# Patient Record
Sex: MALE
[De-identification: names, ages, dates, MRNs, and addresses within clinical notes are randomized per-mention and may not be internally consistent; named-entity substitution may affect disease eponyms.]

---

## 2023-06-29 ENCOUNTER — APPOINTMENT (OUTPATIENT)
Dept: UROLOGY | Facility: CLINIC | Age: 28
End: 2023-06-29
Payer: COMMERCIAL

## 2023-06-29 VITALS
SYSTOLIC BLOOD PRESSURE: 119 MMHG | TEMPERATURE: 98.2 F | BODY MASS INDEX: 27.49 KG/M2 | HEIGHT: 65 IN | RESPIRATION RATE: 14 BRPM | DIASTOLIC BLOOD PRESSURE: 83 MMHG | HEART RATE: 92 BPM | WEIGHT: 165 LBS | OXYGEN SATURATION: 96 %

## 2023-06-29 DIAGNOSIS — R39.9 UNSPECIFIED SYMPTOMS AND SIGNS INVOLVING THE GENITOURINARY SYSTEM: ICD-10-CM

## 2023-06-29 DIAGNOSIS — L29.1 PRURITUS SCROTI: ICD-10-CM

## 2023-06-29 PROBLEM — Z00.00 ENCOUNTER FOR PREVENTIVE HEALTH EXAMINATION: Status: ACTIVE | Noted: 2023-06-29

## 2023-06-29 PROCEDURE — 99204 OFFICE O/P NEW MOD 45 MIN: CPT

## 2023-06-29 RX ORDER — CLOTRIMAZOLE AND BETAMETHASONE DIPROPIONATE 10; .5 MG/G; MG/G
1-0.05 CREAM TOPICAL 3 TIMES DAILY
Qty: 2 | Refills: 5 | Status: ACTIVE | COMMUNITY
Start: 2023-06-29 | End: 1900-01-01

## 2023-06-29 NOTE — PHYSICAL EXAM
[General Appearance - Well Developed] : well developed [General Appearance - Well Nourished] : well nourished [Normal Appearance] : normal appearance [Well Groomed] : well groomed [General Appearance - In No Acute Distress] : no acute distress [Edema] : no peripheral edema [Respiration, Rhythm And Depth] : normal respiratory rhythm and effort [Exaggerated Use Of Accessory Muscles For Inspiration] : no accessory muscle use [Abdomen Soft] : soft [Abdomen Tenderness] : non-tender [Costovertebral Angle Tenderness] : no ~M costovertebral angle tenderness [Urethral Meatus] : meatus normal [Urinary Bladder Findings] : the bladder was normal on palpation [Scrotum] : the scrotum was normal [Testes Mass (___cm)] : there were no testicular masses [FreeTextEntry1] : erythematous and itchy plaque on right hemiscrotum [Normal Station and Gait] : the gait and station were normal for the patient's age [] : no rash [No Focal Deficits] : no focal deficits [Oriented To Time, Place, And Person] : oriented to person, place, and time [Affect] : the affect was normal [Mood] : the mood was normal [Not Anxious] : not anxious [No Palpable Adenopathy] : no palpable adenopathy

## 2023-06-29 NOTE — HISTORY OF PRESENT ILLNESS
[FreeTextEntry1] : Mr. KING is a 28 year  Declined  M who comes today to clinic for dysuria for about 4 weeks. Initially got treated for Chlamydia with Azithromycin 1 dose. \par No history of STDs in the past. \par Sexually active, not in a commited relationship. Denies rectal intercourse.\par Denies LUTS, fevers, chills, hematuria, flank pain. He has never smoked. \par No family history of Prostate cancer.\par \par

## 2023-06-29 NOTE — ASSESSMENT
[FreeTextEntry1] : Mr. KING is a 28 year  Declined  M who comes today to clinic for urethritis likely unresolved chlamydia, and scrotal tinea. \par \par We discussed the the different possible presentations of UTIs/STDs in males including but not limited to urethritis, prostatitis, orchitis, genital ulcers, genital warts, inguinal lymphadenopathy, balanitis, papules, blisters.\par \par Urethritis/Prostatitis\par Infectious urethritis/prostatitis is typically caused by a sexually transmitted pathogen; thus, most cases are seen in young, sexually active men. Neisseria gonorrhoeae and Chlamydia trachomatis are commonly identified in cases of urethritis. Mycoplasma genitalium has also been strongly associated with urethritis. Dysuria, or discomfort with urination, is usually the chief complaint in males with urethritis and is reported in the majority of males with gonorrhea and over half of patients with nongonococcal urethritis. Other complaints include pruritus, burning, and discharge at the urethral meatus.\par \par

## 2023-06-30 ENCOUNTER — APPOINTMENT (OUTPATIENT)
Dept: UROLOGY | Facility: CLINIC | Age: 28
End: 2023-06-30
Payer: COMMERCIAL

## 2023-06-30 LAB
APPEARANCE: CLEAR
BACTERIA: NEGATIVE /HPF
BILIRUBIN URINE: NEGATIVE
BLOOD URINE: NEGATIVE
C TRACH RRNA SPEC QL NAA+PROBE: NOT DETECTED
CAST: 0 /LPF
COLOR: YELLOW
EPITHELIAL CELLS: 0 /HPF
GLUCOSE QUALITATIVE U: NEGATIVE MG/DL
KETONES URINE: NEGATIVE MG/DL
LEUKOCYTE ESTERASE URINE: NEGATIVE
MICROSCOPIC-UA: NORMAL
N GONORRHOEA RRNA SPEC QL NAA+PROBE: NOT DETECTED
NITRITE URINE: NEGATIVE
PH URINE: 7.5
PROTEIN URINE: NEGATIVE MG/DL
RED BLOOD CELLS URINE: 0 /HPF
SOURCE AMPLIFICATION: NORMAL
SPECIFIC GRAVITY URINE: 1.01
UROBILINOGEN URINE: 0.2 MG/DL
WHITE BLOOD CELLS URINE: 0 /HPF

## 2023-06-30 PROCEDURE — 99213 OFFICE O/P EST LOW 20 MIN: CPT

## 2023-06-30 RX ORDER — CEFTRIAXONE 1 G/1
1 INJECTION, POWDER, FOR SOLUTION INTRAMUSCULAR; INTRAVENOUS
Qty: 1 | Refills: 0 | Status: COMPLETED | OUTPATIENT
Start: 2023-06-30

## 2023-06-30 RX ADMIN — CEFTRIAXONE SODIUM 0 GM: 1 INJECTION, POWDER, FOR SOLUTION INTRAMUSCULAR; INTRAVENOUS at 00:00

## 2023-07-03 LAB — BACTERIA UR CULT: NORMAL

## 2023-07-05 LAB — URINE CYTOLOGY: NORMAL

## 2023-07-05 RX ORDER — CEFTRIAXONE 1 G/1
1 INJECTION, POWDER, FOR SOLUTION INTRAMUSCULAR; INTRAVENOUS
Qty: 1 | Refills: 0 | Status: COMPLETED | COMMUNITY
Start: 2023-06-30 | End: 2023-06-30

## 2023-07-05 NOTE — HISTORY OF PRESENT ILLNESS
[FreeTextEntry1] : Current Meds\par Clotrimazole-Betamethasone 1-0.05 % External Cream; APPLY SPARINGLY TO\par AFFECTED AREA(S) 3 TIMES A DAY\par Doxycycline Hyclate 100 MG Oral Tablet; TAKE 1 TABLET EVERY 12 HOURS DAILY\par \par Allergies\par No Known Drug Allergies\par \par Procedure\par Patient was given an injection of Ceftriaxone. Injection was given in the left deltoid. Patient tolerated injection well. \par \par \par Lot# [id number]\par Exp: 01/2025

## 2023-07-06 LAB
MYCOPLASMA HOMINIS CULTURE: POSITIVE
UREAPLASMA CULTURE: NEGATIVE

## 2023-07-17 ENCOUNTER — NON-APPOINTMENT (OUTPATIENT)
Age: 28
End: 2023-07-17

## 2023-07-17 ENCOUNTER — RX RENEWAL (OUTPATIENT)
Age: 28
End: 2023-07-17

## 2023-07-17 RX ORDER — DOXYCYCLINE HYCLATE 100 MG/1
100 TABLET ORAL
Qty: 42 | Refills: 0 | Status: ACTIVE | COMMUNITY
Start: 2023-06-29 | End: 1900-01-01

## 2023-07-20 ENCOUNTER — APPOINTMENT (OUTPATIENT)
Dept: UROLOGY | Facility: CLINIC | Age: 28
End: 2023-07-20
Payer: COMMERCIAL

## 2023-07-20 VITALS
SYSTOLIC BLOOD PRESSURE: 120 MMHG | BODY MASS INDEX: 27.32 KG/M2 | OXYGEN SATURATION: 98 % | DIASTOLIC BLOOD PRESSURE: 68 MMHG | WEIGHT: 164 LBS | TEMPERATURE: 98.1 F | RESPIRATION RATE: 19 BRPM | HEIGHT: 65 IN | HEART RATE: 86 BPM

## 2023-07-20 DIAGNOSIS — N34.2 OTHER URETHRITIS: ICD-10-CM

## 2023-07-20 PROCEDURE — 99213 OFFICE O/P EST LOW 20 MIN: CPT

## 2023-07-20 NOTE — HISTORY OF PRESENT ILLNESS
[FreeTextEntry1] : Mr. KING is a 28 year  Declined  M who comes today to clinic for dysuria for about 4 weeks. Initially got treated for Chlamydia with Azithromycin 1 dose. \par No history of STDs in the past. \par Sexually active, not in a commited relationship. Denies rectal intercourse.\par Denies LUTS, fevers, chills, hematuria, flank pain. He has never smoked. \par No family history of Prostate cancer.\par \par 7/20/23\par fu\par dysuria disappeared.\par No LUTS\par tinea corporis also improved with topical antifungals\par Denies fever, chills\par

## 2023-07-20 NOTE — ASSESSMENT
[FreeTextEntry1] : Mr. KING is a 28 year  Declined  M who comes today to clinic for urethritis, and scrotal tinea. Both resolved with treatment.  \par \par We discussed the the different possible presentations of UTIs/STDs in males including but not limited to urethritis, prostatitis, orchitis, genital ulcers, genital warts, inguinal lymphadenopathy, balanitis, papules, blisters.\par \par Urethritis/Prostatitis\par Infectious urethritis/prostatitis is typically caused by a sexually transmitted pathogen; thus, most cases are seen in young, sexually active men. Neisseria gonorrhoeae and Chlamydia trachomatis are commonly identified in cases of urethritis. Mycoplasma genitalium has also been strongly associated with urethritis. Dysuria, or discomfort with urination, is usually the chief complaint in males with urethritis and is reported in the majority of males with gonorrhea and over half of patients with nongonococcal urethritis. Other complaints include pruritus, burning, and discharge at the urethral meatus.\par \par

## 2023-07-24 LAB
APPEARANCE: CLEAR
BACTERIA: NEGATIVE /HPF
BILIRUBIN URINE: NEGATIVE
BLOOD URINE: NEGATIVE
CAST: 0 /LPF
COLOR: YELLOW
EPITHELIAL CELLS: 0 /HPF
GLUCOSE QUALITATIVE U: NEGATIVE MG/DL
KETONES URINE: NEGATIVE MG/DL
LEUKOCYTE ESTERASE URINE: NEGATIVE
MICROSCOPIC-UA: NORMAL
NITRITE URINE: NEGATIVE
PH URINE: 7
PROTEIN URINE: NEGATIVE MG/DL
RED BLOOD CELLS URINE: 1 /HPF
SPECIFIC GRAVITY URINE: 1.02
UROBILINOGEN URINE: 0.2 MG/DL
WHITE BLOOD CELLS URINE: 0 /HPF

## 2023-07-25 LAB
C TRACH RRNA SPEC QL NAA+PROBE: NOT DETECTED
N GONORRHOEA RRNA SPEC QL NAA+PROBE: NOT DETECTED
SOURCE AMPLIFICATION: NORMAL

## 2023-07-31 LAB — BACTERIA UR CULT: NORMAL
